# Patient Record
Sex: FEMALE | Race: WHITE | NOT HISPANIC OR LATINO | Employment: STUDENT | URBAN - METROPOLITAN AREA
[De-identification: names, ages, dates, MRNs, and addresses within clinical notes are randomized per-mention and may not be internally consistent; named-entity substitution may affect disease eponyms.]

---
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
## 2023-01-31 ENCOUNTER — TELEPHONE (OUTPATIENT)
Dept: OBGYN CLINIC | Facility: CLINIC | Age: 18
End: 2023-01-31

## 2023-01-31 DIAGNOSIS — Z30.41 SURVEILLANCE FOR BIRTH CONTROL, ORAL CONTRACEPTIVES: ICD-10-CM

## 2023-01-31 RX ORDER — NORGESTIMATE AND ETHINYL ESTRADIOL 0.25-0.035
1 KIT ORAL DAILY
Qty: 84 TABLET | Refills: 0 | Status: SHIPPED | OUTPATIENT
Start: 2023-01-31 | End: 2023-04-25

## 2023-01-31 NOTE — TELEPHONE ENCOUNTER
Rec'd call from patient's mother left message requesting refill of her daughter's prescription  Patient's mother not listed on HIPPA (communication page)  Called patient advised her she overdue for a WA and need to schedule  Once scheduled  Will forward medication request for [facility] to review and appropriate to sent in courtesy refills  Ph[facility]  She is scheduled for 03/28/23  [facility] please address

## 2023-02-23 ENCOUNTER — TELEPHONE (OUTPATIENT)
Dept: PSYCHIATRY | Facility: CLINIC | Age: 18
End: 2023-02-23

## 2023-02-23 NOTE — TELEPHONE ENCOUNTER
Mom called to inquire about [facility] therapy services  Writer advised mom that there is a waitlist and added patient to the list  Patient needs 5 or 6 pm appointments

## 2023-03-22 ENCOUNTER — TELEPHONE (OUTPATIENT)
Dept: PSYCHIATRY | Facility: CLINIC | Age: 18
End: 2023-03-22

## 2023-03-22 NOTE — TELEPHONE ENCOUNTER
Behavioral Health Outpatient Intake Questions    Referred By   : Self     Please advise interviewee that they need to answer all questions truthfully to allow for best care, and any misrepresentations of information may affect their ability to be seen at this clinic   => Was this discussed? Yes     If Minor Child (under age 25)    Who is/are the legal guardian(s) of the child? Is there a custody agreement? No     • If "YES"- Custody orders must be obtained prior to scheduling the first appointment  • In addition, Consent to Treatment must be signed by all legal guardians prior to scheduling the first appointment    • If "NO"- Consent to Treatment must be signed by all legal guardians prior to scheduling the first appointment    Behavioral Health Outpatient Intake History -     Presenting Problem (in patient's own words): Anxiety, stress     Are there any communication barriers for this patient? No                                               If yes, please describe barriers: no  • If there is a unique situation, please refer to [address] [place] [address] for final determination  Are you taking any psychiatric medications? No   •   If "YES" -What are they no   •   If "YES" -Who prescribes? Has the Patient previously received outpatient Talk Therapy or Medication Management from [facility]     •    If "YES"- When, Where and with Whom? •    If "NO" -Has Patient received these services elsewhere? •   If "YES" -When, Where, and with Whom? Individual private therapy, does not remember location    Has the Patient abused alcohol or other substances in the last 6 months ? No  No concerns of substance abuse are reported  •  If "YES" -What substance, How much, How often? •  If illegal substance: Refer to [employer] [facility] (for FRANCA) or [employer]    •  If Alcohol in excess of 10 drinks per week:  Refer to [employer] [facility] (for FRANCA) or [address] [place] [address] History-     Is this treatment court ordered? No   • If "Yes"- refer to [address] [place] [address] for final determination  Has the Patient been convicted of a felony? No  •  If "Yes" -When, What? • Talk Therapy : Send to [address] [place] [address] for final determination   • Med Management: Send to Dr Yash Woods for final determination     ACCEPTED as a patient Yes  • If "Yes" Appointment Date: 03/03/2023 @ 5PM with Emilia Raddle Beam    Referred Elsewhere? No  • If “Yes” - (Where? Ex: Hardik Corey, [facility]UDRE[facility]WANDA, [address] [place] [address], etc )       Name of Insurance Co: [address] [place] [address] ID# [id number]  Insurance Phone #  If ins is primary or secondary? Primary  If patient is a minor, parents information such as Name, D  O B of guarantor

## 2023-03-23 ENCOUNTER — DOCUMENTATION (OUTPATIENT)
Dept: BEHAVIORAL/MENTAL HEALTH CLINIC | Facility: CLINIC | Age: 18
End: 2023-03-23

## 2023-03-27 NOTE — PROGRESS NOTES
[address], [facility], [address] [facility]    ASSESSMENT/PLAN: Alva Lizama is a 21 y o  Roland Goodwin who presents for annual gynecologic exam     Encounter for routine gynecologic examination  - Routine well woman exam completed today  - HPV Vaccination status: Immunization series complete  Neg  covid  Vaccine   Counseled on    - STI screening offered including HIV testing: today  - Contraceptive counseling discussed  Current contraception: combination OCPs:     Additional problems addressed during this visit:  1  Encounter for gynecological examination without abnormal finding    2  Dysmenorrhea    3  Screen for STD (sexually transmitted disease)  -     Chlamydia/GC amplified DNA by PCR; Future  -     Chlamydia/GC amplified DNA by PCR    4  Surveillance for birth control, oral contraceptives    5  Contraceptive education  -     norethindrone-ethinyl estradiol-ferrous fumarate (Junel Fe 24) 1-20 MG-MCG() per tablet; Take 1 tablet by mouth daily    Dw pt  Motrin 600 mg every 6 hours while awake start w onset of period  Start  Junel 1/20 24 d  To lighten period    Reviewed ACHES and BTB    CC:  Annual Gynecologic Examination    HPI: Alva Lizama is a 21 y o  Roland Goodwin who presents for annual gynecologic examination  20 yo  her for wellness exam   -30 d for 4-5  D mild  Cramping  On  Ocp Denies ACHES and BTB    No family hx of breast ovarian or colon cancer   + feels her mood is off on  OCP     + night sweats   w menses   No missed pills   + sexually active using condoms  The following portions of the patient's history were reviewed and updated as appropriate: She  has a past medical history of Asthma  She  has a past surgical history that includes Tonsillectomy (10/21/2021)  Her family history is not on file  She  reports that she has never smoked  She has never been exposed to tobacco smoke   She has never used smokeless tobacco  She reports "that she does not drink alcohol and does not use drugs  Current Outpatient Medications   Medication Sig Dispense Refill   • Atrovent HFA 17 MCG/ACT inhaler TAKE 2 PUFF(S) (INHALATION) 3 TIMES PER DAY FOR 30 DAYS     • famotidine (PEPCID) 40 MG tablet Take 40 mg by mouth daily at bedtime     • Flovent  MCG/ACT inhaler INHALE 2 PUFFS BY MOUTH TWICE A DAY  RINSE MOUTH AFTER USE FOR DAILY USE FOR ASTHMA     • montelukast (SINGULAIR) 10 mg tablet Take 10 mg by mouth daily     • norethindrone-ethinyl estradiol-ferrous fumarate (Junel Fe 24) 1-20 MG-MCG(24) per tablet Take 1 tablet by mouth daily 90 tablet 4   • omeprazole (PriLOSEC) 40 MG capsule Take 1 capsule (40 mg total) by mouth daily 30 capsule 1   • senna (SENOKOT) 8 6 mg Take 2 tablets (17 2 mg total) by mouth daily at bedtime 60 tablet 2     No current facility-administered medications for this visit  She is allergic to amoxicillin       Review of Systems   Constitutional: Negative for chills and fever  HENT: Negative for ear pain and sore throat  Eyes: Negative for pain and visual disturbance  Respiratory: Negative for cough and shortness of breath  Cardiovascular: Negative for chest pain and palpitations  Gastrointestinal: Negative for abdominal pain and vomiting  Genitourinary: Negative for dysuria, hematuria and menstrual problem  Musculoskeletal: Negative for arthralgias and back pain  Skin: Negative for color change and rash  Neurological: Negative for seizures and syncope  Hematological: Negative  Psychiatric/Behavioral: Negative  All other systems reviewed and are negative  Objective:  /70 (BP Location: Left arm, Patient Position: Sitting, Cuff Size: Standard)   Ht 5' 5 5\" (1 664 m)   Wt 53 1 kg (117 lb)   LMP 03/21/2023   BMI 19 17 kg/m²    Physical Exam  Vitals and nursing note reviewed  Constitutional:       Appearance: Normal appearance  HENT:      Head: Normocephalic     Cardiovascular:      " Rate and Rhythm: Normal rate and regular rhythm  Pulses: Normal pulses  Pulmonary:      Effort: Pulmonary effort is normal       Breath sounds: Normal breath sounds  Chest:      Chest wall: No mass, lacerations, swelling, tenderness or edema  Breasts: Felice Score is 4  Breasts are symmetrical       Right: Normal  No swelling, bleeding, inverted nipple, mass, nipple discharge, skin change or tenderness  Left: No swelling, bleeding, inverted nipple, mass, nipple discharge, skin change or tenderness  Abdominal:      General: Abdomen is flat  Bowel sounds are normal       Palpations: Abdomen is soft  Genitourinary:     General: Normal vulva  Exam position: Lithotomy position  Pubic Area: No rash  Felice stage (genital): 4       Labia:         Right: No rash, tenderness or lesion  Left: No rash, tenderness or lesion  Urethra: No urethral pain, urethral swelling or urethral lesion  Vagina: Normal       Cervix: No cervical motion tenderness or discharge  Uterus: Normal        Adnexa: Right adnexa normal and left adnexa normal       Rectum: Normal    Musculoskeletal:         General: Normal range of motion  Cervical back: Neck supple  Lymphadenopathy:      Upper Body:      Right upper body: No supraclavicular, axillary or pectoral adenopathy  Left upper body: No supraclavicular, axillary or pectoral adenopathy  Lower Body: No right inguinal adenopathy  No left inguinal adenopathy  Skin:     General: Skin is warm and dry  Neurological:      General: No focal deficit present  Mental Status: She is alert and oriented to person, place, and time  Psychiatric:         Mood and Affect: Mood normal          Behavior: Behavior normal          Thought Content:  Thought content normal          Judgment: Judgment normal

## 2023-03-27 NOTE — PATIENT INSTRUCTIONS
Pap every 3 years if normal, STI testing as indicated, starting at age  22 exercise most days of week, obtain appropriate diet and hydration, Calcium 1000mg + 600 vit D daily, birth control as directed (ACHES reviewed)  Benefits, risks and alternatives discussed/reviewed  Condom use when sexually active for sexually transmitted infection prevention  HPV 9 vaccine recommended through age 39  Check with your insurance for coverage  If covered, call office to schedule start of vaccine series  Annual mammogram starting at age 36, monthly breast self exam  Trinda Band   at red light or at least  20 times a day

## 2023-03-28 ENCOUNTER — ANNUAL EXAM (OUTPATIENT)
Dept: OBGYN CLINIC | Facility: CLINIC | Age: 18
End: 2023-03-28

## 2023-03-28 VITALS
HEIGHT: 66 IN | WEIGHT: 117 LBS | SYSTOLIC BLOOD PRESSURE: 110 MMHG | BODY MASS INDEX: 18.8 KG/M2 | DIASTOLIC BLOOD PRESSURE: 70 MMHG

## 2023-03-28 DIAGNOSIS — Z30.41 SURVEILLANCE FOR BIRTH CONTROL, ORAL CONTRACEPTIVES: ICD-10-CM

## 2023-03-28 DIAGNOSIS — Z01.419 ENCOUNTER FOR GYNECOLOGICAL EXAMINATION WITHOUT ABNORMAL FINDING: Primary | ICD-10-CM

## 2023-03-28 DIAGNOSIS — Z11.3 SCREEN FOR STD (SEXUALLY TRANSMITTED DISEASE): ICD-10-CM

## 2023-03-28 DIAGNOSIS — Z30.09 CONTRACEPTIVE EDUCATION: ICD-10-CM

## 2023-03-28 DIAGNOSIS — N94.6 DYSMENORRHEA: ICD-10-CM

## 2023-03-28 RX ORDER — FAMOTIDINE 40 MG/1
40 TABLET, FILM COATED ORAL
COMMUNITY
Start: 2023-03-16

## 2023-03-28 RX ORDER — DEXAMETHASONE 4 MG/1
TABLET ORAL
COMMUNITY
Start: 2023-01-02

## 2023-03-28 RX ORDER — NORETHINDRONE ACETATE AND ETHINYL ESTRADIOL AND FERROUS FUMARATE 1MG-20(24)
1 KIT ORAL DAILY
Qty: 90 TABLET | Refills: 4 | Status: SHIPPED | OUTPATIENT
Start: 2023-03-28 | End: 2024-03-27

## 2023-03-28 RX ORDER — MONTELUKAST SODIUM 10 MG/1
10 TABLET ORAL DAILY
COMMUNITY
Start: 2023-03-22

## 2023-03-28 RX ORDER — IPRATROPIUM BROMIDE 17 UG/1
AEROSOL, METERED RESPIRATORY (INHALATION)
COMMUNITY
Start: 2022-12-27

## 2023-03-30 LAB
C TRACH RRNA SPEC QL NAA+PROBE: NEGATIVE
N GONORRHOEA RRNA SPEC QL NAA+PROBE: NEGATIVE

## 2023-04-03 ENCOUNTER — SOCIAL WORK (OUTPATIENT)
Dept: BEHAVIORAL/MENTAL HEALTH CLINIC | Facility: CLINIC | Age: 18
End: 2023-04-03

## 2023-04-03 DIAGNOSIS — F43.22 ADJUSTMENT DISORDER WITH ANXIETY: Primary | ICD-10-CM

## 2023-04-03 DIAGNOSIS — Z63.9 FAMILY DYNAMICS PROBLEM: ICD-10-CM

## 2023-04-03 PROBLEM — F43.21 ADJUSTMENT DISORDER WITH DEPRESSED MOOD: Status: ACTIVE | Noted: 2023-04-03

## 2023-04-03 SDOH — SOCIAL STABILITY - SOCIAL INSECURITY: PROBLEM RELATED TO PRIMARY SUPPORT GROUP, UNSPECIFIED: Z63.9
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
## 2023-05-08 ENCOUNTER — TELEPHONE (OUTPATIENT)
Dept: BEHAVIORAL/MENTAL HEALTH CLINIC | Facility: CLINIC | Age: 18
End: 2023-05-08

## 2023-05-08 NOTE — TELEPHONE ENCOUNTER
Client called to reschedule her appointment for today  The clinician and client rescheduled the appointment for Wednesday 5/24th  Client confirmed her availability for that day

## 2023-06-12 ENCOUNTER — SOCIAL WORK (OUTPATIENT)
Dept: BEHAVIORAL/MENTAL HEALTH CLINIC | Facility: CLINIC | Age: 18
End: 2023-06-12
Payer: COMMERCIAL

## 2023-06-12 DIAGNOSIS — Z63.9 FAMILY DYNAMICS PROBLEM: ICD-10-CM

## 2023-06-12 DIAGNOSIS — F43.22 ADJUSTMENT DISORDER WITH ANXIETY: Primary | ICD-10-CM

## 2023-06-12 PROCEDURE — 90834 PSYTX W PT 45 MINUTES: CPT

## 2023-06-12 SDOH — SOCIAL STABILITY - SOCIAL INSECURITY: PROBLEM RELATED TO PRIMARY SUPPORT GROUP, UNSPECIFIED: Z63.9
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
## 2023-06-26 ENCOUNTER — TELEPHONE (OUTPATIENT)
Dept: OBGYN CLINIC | Facility: CLINIC | Age: 18
End: 2023-06-26

## 2023-06-26 DIAGNOSIS — Z30.41 SURVEILLANCE FOR BIRTH CONTROL, ORAL CONTRACEPTIVES: ICD-10-CM

## 2023-06-26 DIAGNOSIS — N94.6 DYSMENORRHEA: Primary | ICD-10-CM

## 2023-06-26 DIAGNOSIS — Z30.09 CONTRACEPTIVE EDUCATION: ICD-10-CM

## 2023-06-26 NOTE — TELEPHONE ENCOUNTER
Brady Watson, pts mother left a message Junel 24 Fe requires prior authorization  Started prior authorization on covermymeds  com  Fisher: [id number]  Ivy Lara Listed pt tried Junel Fe 1/20; Cryselle 0 3-30 mg-mcg; Sprintec 0 25-35 mg-mcg   All three medications caused pt Depression & mood swings  Awaiting [facility] response

## 2023-06-27 NOTE — TELEPHONE ENCOUNTER
[facility] denied Junel Fe 24  List of covered meds: [facility]acy [facility], [place], Lo-Zumandimine, Miriam Blue,  Drospirenone-Ethinyl Estradiol 3-0 02 mg  [facility]

## 2023-06-28 RX ORDER — NORETHINDRONE ACETATE AND ETHINYL ESTRADIOL, ETHINYL ESTRADIOL AND FERROUS FUMARATE 1MG-10(24)
1 KIT ORAL DAILY
Qty: 90 TABLET | Refills: 2 | Status: SHIPPED | OUTPATIENT
Start: 2023-06-28

## 2023-06-29 NOTE — TELEPHONE ENCOUNTER
Spoke with patient and informed her that her prescription was sent for Lo Loestrin Fe to her pharmacy  Patient verbalized understanding and appreciated phone call

## 2023-07-10 ENCOUNTER — SOCIAL WORK (OUTPATIENT)
Dept: BEHAVIORAL/MENTAL HEALTH CLINIC | Facility: CLINIC | Age: 18
End: 2023-07-10
Payer: COMMERCIAL

## 2023-07-10 DIAGNOSIS — Z63.9 FAMILY DYNAMICS PROBLEM: ICD-10-CM

## 2023-07-10 DIAGNOSIS — F43.22 ADJUSTMENT DISORDER WITH ANXIETY: Primary | ICD-10-CM

## 2023-07-10 PROCEDURE — 90834 PSYTX W PT 45 MINUTES: CPT

## 2023-07-10 SDOH — SOCIAL STABILITY - SOCIAL INSECURITY: PROBLEM RELATED TO PRIMARY SUPPORT GROUP, UNSPECIFIED: Z63.9
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
## 2023-08-14 ENCOUNTER — SOCIAL WORK (OUTPATIENT)
Dept: BEHAVIORAL/MENTAL HEALTH CLINIC | Facility: CLINIC | Age: 18
End: 2023-08-14
Payer: COMMERCIAL

## 2023-08-14 DIAGNOSIS — F43.22 ADJUSTMENT DISORDER WITH ANXIETY: Primary | ICD-10-CM

## 2023-08-14 PROCEDURE — 90834 PSYTX W PT 45 MINUTES: CPT

## 2023-08-16 NOTE — PSYCH
Behavioral Health Psychotherapy Progress Note    Psychotherapy Provided: Individual Psychotherapy     No diagnosis found. Goals addressed in session: Goal 1     DATA: Session focused on the client adjusting to her boyfriend being away at school. She stated that over the summer she spent most days with her boyfriend, and now that he has returned to school she worries. She expressed that she has concerns about his excessive drinking and at times his lack of common sense. Client discussed her frustrations with her boyfriend's family and the challenges she experiences. During this session, this clinician used the following therapeutic modalities: Engagement Strategies, Client-centered Therapy, Cognitive Behavioral Therapy, Motivational Interviewing and Supportive Psychotherapy    Substance Abuse was not addressed during this session. If the client is diagnosed with a co-occurring substance use disorder, please indicate any changes in the frequency or amount of use: None. Stage of change for addressing substance use diagnoses: No substance use/Not applicable    ASSESSMENT:  Donnell Meza presents with a Euthymic/ normal mood. her affect is Normal range and intensity, which is congruent, with her mood and the content of the session. The client has made progress on their goals. Donnell Meza presents with a minimal risk of suicide, minimal risk of self-harm, and minimal risk of harm to others. For any risk assessment that surpasses a "low" rating, a safety plan must be developed. A safety plan was indicated: no  If yes, describe in detail N/A    PLAN: Between sessions, Donnell Meza will reflect on session and practice self care. At the next session, the therapist will use Engagement Strategies, Client-centered Therapy, Cognitive Behavioral Therapy, Motivational Interviewing and Supportive Psychotherapy to address anxiety and emotional regulation.     Behavioral Health Treatment Plan and Discharge Planning: Thereasa Cousin is aware of and agrees to continue to work on their treatment plan. They have identified and are working toward their discharge goals.  yes    This note was not shared with the patient due to this is a psychotherapy note    Visit start and stop times:    08/16/23  Start Time: 1700  Stop Time: 1750  Total Visit Time: 50 minutes

## 2023-08-28 ENCOUNTER — SOCIAL WORK (OUTPATIENT)
Dept: BEHAVIORAL/MENTAL HEALTH CLINIC | Facility: CLINIC | Age: 18
End: 2023-08-28
Payer: COMMERCIAL

## 2023-08-28 DIAGNOSIS — F43.22 ADJUSTMENT DISORDER WITH ANXIETY: Primary | ICD-10-CM

## 2023-08-28 PROCEDURE — 90834 PSYTX W PT 45 MINUTES: CPT

## 2023-09-03 NOTE — PSYCH
Behavioral Health Psychotherapy Progress Note    Psychotherapy Provided: Individual Psychotherapy     1. Adjustment disorder with anxiety            Goals addressed in session: Goal 1     DATA: Session focused on the anxiety the client was experiencing since the last session. The client expressed having feelings of nervousness and pain in her stomach that she believes were anxiety related. She explained that she noticed these feelings when she is traveling outside of her home town. Clinician spoke to the client about implementing some positive self talk and breathing techniques to help with managing uncomfortable feelings. During this session, this clinician used the following therapeutic modalities: Engagement Strategies, Client-centered Therapy, Cognitive Behavioral Therapy, Motivational Interviewing and Supportive Psychotherapy    Substance Abuse was not addressed during this session. If the client is diagnosed with a co-occurring substance use disorder, please indicate any changes in the frequency or amount of use: None. Stage of change for addressing substance use diagnoses: No substance use/Not applicable    ASSESSMENT:  Adolphus Nyhan presents with a Euthymic/ normal mood. her affect is Normal range and intensity, which is congruent, with her mood and the content of the session. The client has made progress on their goals. Adolphus Nyhan presents with a minimal risk of suicide, minimal risk of self-harm, and minimal risk of harm to others. For any risk assessment that surpasses a "low" rating, a safety plan must be developed. A safety plan was indicated: no  If yes, describe in detail: N/A    PLAN: Between sessions, Adolphus Nyhan will reflect on session content and practice self care.  At the next session, the therapist will use Engagement Strategies, Client-centered Therapy and Cognitive Behavioral Therapy to address anxiety symptoms, triggers, and coping skills. Behavioral Health Treatment Plan and Discharge Planning: Amy Lynne is aware of and agrees to continue to work on their treatment plan. They have identified and are working toward their discharge goals.  yes    This note was not shared with the patient due to this is a psychotherapy note    Visit start and stop times:    09/03/23  Start Time: 1705  Stop Time: 1748  Total Visit Time: 43 minutes

## 2023-09-18 ENCOUNTER — SOCIAL WORK (OUTPATIENT)
Dept: BEHAVIORAL/MENTAL HEALTH CLINIC | Facility: CLINIC | Age: 18
End: 2023-09-18
Payer: COMMERCIAL

## 2023-09-18 DIAGNOSIS — F43.22 ADJUSTMENT DISORDER WITH ANXIETY: Primary | ICD-10-CM

## 2023-09-18 PROCEDURE — 90834 PSYTX W PT 45 MINUTES: CPT

## 2023-09-20 NOTE — PSYCH
Behavioral Health Psychotherapy Progress Note    Psychotherapy Provided: Individual Psychotherapy     1. Adjustment disorder with anxiety            Goals addressed in session: Goal 1     DATA: Session focused on the client's uncertainity with decision making. The client contemplated during the session about her future plans in life. She stated that in November she would be graduating from [employer] school. 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 The clinician engaged the client to think through the thoughts and had the client practice challenging her anxious thoughts. During this session, this clinician used the following therapeutic modalities: Engagement Strategies, Client-centered Therapy, Cognitive Behavioral Therapy, Motivational Interviewing and Supportive Psychotherapy    Substance Abuse was not addressed during this session. If the client is diagnosed with a co-occurring substance use disorder, please indicate any changes in the frequency or amount of use: None. Stage of change for addressing substance use diagnoses: No substance use/Not applicable    ASSESSMENT:  Corbin Oneil presents with a Euthymic/ normal and Anxious mood. her affect is Normal range and intensity, which is congruent, with her mood and the content of the session. The client has made progress on their goals. Corbin Oneil presents with a minimal risk of suicide, minimal risk of self-harm, and minimal risk of harm to others. For any risk assessment that surpasses a "low" rating, a safety plan must be developed. A safety plan was indicated: no  If yes, describe in detail N/A    PLAN: Between sessions, Corbin Oneil will reflect on session, reflecting on goals and making a plan.  At the next session, the therapist will use Engagement Strategies, Client-centered Therapy, Cognitive Behavioral Therapy, Motivational Interviewing and Supportive Psychotherapy to address treatment plan goals. Behavioral Health Treatment Plan and Discharge Planning: Corbin Oneil is aware of and agrees to continue to work on their treatment plan. They have identified and are working toward their discharge goals.  yes    This note was not shared with the patient due to this is a psychotherapy note    Visit start and stop times:    09/20/23  Start Time: 1708  Stop Time: 1755  Total Visit Time: 47 minutes

## 2023-10-02 ENCOUNTER — TELEMEDICINE (OUTPATIENT)
Dept: BEHAVIORAL/MENTAL HEALTH CLINIC | Facility: CLINIC | Age: 18
End: 2023-10-02
Payer: COMMERCIAL

## 2023-10-02 DIAGNOSIS — F43.22 ADJUSTMENT DISORDER WITH ANXIETY: Primary | ICD-10-CM

## 2023-10-02 PROCEDURE — 90834 PSYTX W PT 45 MINUTES: CPT
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
## 2023-10-30 ENCOUNTER — SOCIAL WORK (OUTPATIENT)
Dept: BEHAVIORAL/MENTAL HEALTH CLINIC | Facility: CLINIC | Age: 18
End: 2023-10-30
Payer: COMMERCIAL

## 2023-10-30 DIAGNOSIS — F43.22 ADJUSTMENT DISORDER WITH ANXIETY: Primary | ICD-10-CM

## 2023-10-30 PROCEDURE — 90834 PSYTX W PT 45 MINUTES: CPT

## 2023-11-03 NOTE — PSYCH
Behavioral Health Psychotherapy Progress Note    Psychotherapy Provided: Individual Psychotherapy     1. Adjustment disorder with anxiety            Goals addressed in session: Goal 1     DATA: Session began with a mental health check in with the client. The client stated that she was going through some issues with her close friends. The client discussed the difference between her lifestyle and that with her friends. The client discussed the history with her friends and they prioritized partying compared to her focus on work and her relationship. The client stated that she was struggling with maintaining the friendship or distancing herself. During this session, this clinician used the following therapeutic modalities: Engagement Strategies, Client-centered Therapy, Cognitive Behavioral Therapy, Motivational Interviewing, and Supportive Psychotherapy    Substance Abuse was not addressed during this session. If the client is diagnosed with a co-occurring substance use disorder, please indicate any changes in the frequency or amount of use: None. Stage of change for addressing substance use diagnoses: No substance use/Not applicable    ASSESSMENT:  Reese Lauren presents with a Euthymic/ normal mood. her affect is Normal range and intensity, which is congruent, with her mood and the content of the session. The client has made progress on their goals. Reese Lauren presents with a minimal risk of suicide, minimal risk of self-harm, and minimal risk of harm to others. For any risk assessment that surpasses a "low" rating, a safety plan must be developed. A safety plan was indicated: no  If yes, describe in detail N/A    PLAN: Between sessions, Reese Lauren will reflect on the content of session and practice boundary setting.  At the next session, the therapist will use Engagement Strategies, Client-centered Therapy, Cognitive Behavioral Therapy, Motivational Interviewing, and Supportive Psychotherapy to address anxiety, boundary setting, and emotional regulation. Behavioral Health Treatment Plan and Discharge Planning: Minh Soto is aware of and agrees to continue to work on their treatment plan. They have identified and are working toward their discharge goals.  yes    This note was not shared with the patient due to this is a psychotherapy note    Visit start and stop times:    11/03/23  Start Time: 1705  Stop Time: 1755  Total Visit Time: 50 minutes

## 2023-11-13 ENCOUNTER — SOCIAL WORK (OUTPATIENT)
Dept: BEHAVIORAL/MENTAL HEALTH CLINIC | Facility: CLINIC | Age: 18
End: 2023-11-13
Payer: COMMERCIAL

## 2023-11-13 DIAGNOSIS — F43.22 ADJUSTMENT DISORDER WITH ANXIETY: Primary | ICD-10-CM

## 2023-11-13 PROCEDURE — 90837 PSYTX W PT 60 MINUTES: CPT

## 2023-11-20 ENCOUNTER — TELEPHONE (OUTPATIENT)
Dept: OBGYN CLINIC | Facility: CLINIC | Age: 18
End: 2023-11-20

## 2023-11-20 NOTE — TELEPHONE ENCOUNTER
Left v/m reminding patient that she has an appt scheduled on 12/7 and to give us a call back regarding test that was advised to take on prior telephone encounter.

## 2023-11-20 NOTE — PSYCH
Behavioral Health Psychotherapy Progress Note    Psychotherapy Provided: Individual Psychotherapy     1. Adjustment disorder with anxiety            Goals addressed in session: Goal 1     DATA: Session began with a mental health check in with client. The client discussed stressful events that have been present since their last session. The client discussed challenges she had been experiencing with her friendships and there differences. During this session, this clinician used the following therapeutic modalities: Engagement Strategies, Client-centered Therapy, Cognitive Behavioral Therapy, Motivational Interviewing, and Supportive Psychotherapy    Substance Abuse was not addressed during this session. If the client is diagnosed with a co-occurring substance use disorder, please indicate any changes in the frequency or amount of use: None. Stage of change for addressing substance use diagnoses: No substance use/Not applicable    ASSESSMENT:  Lanie Gold presents with a Euthymic/ normal mood. her affect is Normal range and intensity, which is congruent, with her mood and the content of the session. The client has made progress on their goals. Lanie Gold presents with a minimal risk of suicide, minimal risk of self-harm, and minimal risk of harm to others. For any risk assessment that surpasses a "low" rating, a safety plan must be developed. A safety plan was indicated: no  If yes, describe in detail N/A    PLAN:  Lanie Gold will reflect on the content of session. At the next session, the therapist will use Engagement Strategies, Client-centered Therapy, Cognitive Behavioral Therapy, Motivational Interviewing, and Supportive Psychotherapy to address emotional dysregulation, boundaries, and self care. Behavioral Health Treatment Plan and Discharge Planning: Lanie Gold is aware of and agrees to continue to work on their treatment plan.  They have identified and are working toward their discharge goals.  Yes    This note was not shared with the patient due to this is a psychotherapy note    Visit start and stop times:    11/20/23  Start Time: 1700  Stop Time: 1753  Total Visit Time: 53 minutes

## 2023-11-20 NOTE — TELEPHONE ENCOUNTER
Patient left v/m concerning her current birth control. Spoke with patient and she reports getting her period and then having bleeding in between her periods lasting for 3-4 days, for the past 2 months. She denies missing any pills or having any added stress in her life. She confirms taking her pill at the same time every day. Patient admits to being sexually active but not consistently. Patient was advised to take a HPT to r/o pregnancy, as she states "it is highly unlikely that I'm pregnant but I'll take one."  Her current pharmacy she uses is as on file. Sandra, please advise for further recommendations.

## 2023-11-27 ENCOUNTER — SOCIAL WORK (OUTPATIENT)
Dept: BEHAVIORAL/MENTAL HEALTH CLINIC | Facility: CLINIC | Age: 18
End: 2023-11-27
Payer: COMMERCIAL

## 2023-11-27 DIAGNOSIS — F43.22 ADJUSTMENT DISORDER WITH ANXIETY: Primary | ICD-10-CM

## 2023-11-27 PROCEDURE — 90837 PSYTX W PT 60 MINUTES: CPT

## 2023-11-30 NOTE — PSYCH
Behavioral Health Psychotherapy Progress Note    Psychotherapy Provided: Individual Psychotherapy     1. Adjustment disorder with anxiety            Goals addressed in session: Goal 1     DATA: Session began with a mental health check in with client. The client discussed stressful events that have been present since their last session. The client discussed challenges she had been experiencing with her friendships and there differences. During this session, this clinician used the following therapeutic modalities: Engagement Strategies, Client-centered Therapy, Cognitive Behavioral Therapy, Motivational Interviewing, and Supportive Psychotherapy    Substance Abuse was not addressed during this session. If the client is diagnosed with a co-occurring substance use disorder, please indicate any changes in the frequency or amount of use: None. Stage of change for addressing substance use diagnoses: No substance use/Not applicable    ASSESSMENT:  Nathaneil Landau presents with a Euthymic/ normal mood. her affect is Normal range and intensity, which is congruent, with her mood and the content of the session. The client has made progress on their goals. Nathaneil Landau presents with a minimal risk of suicide, minimal risk of self-harm, and minimal risk of harm to others. For any risk assessment that surpasses a "low" rating, a safety plan must be developed. A safety plan was indicated: no  If yes, describe in detail N/A    PLAN: Nathaneil Landau will reflect on the content of session, practice and set boundaries. At the next session, the therapist will use Engagement Strategies, Client-centered Therapy, Cognitive Behavioral Therapy, Motivational Interviewing, and Supportive Psychotherapy to address emotional dysregulation, boundaries, anxiety, and coping strategies.     Behavioral Health Treatment Plan and Discharge Planning: Nathaneil Landau is aware of and agrees to continue to work on their treatment plan. They have identified and are working toward their discharge goals.  yes    This note was not shared with the patient due to this is a psychotherapy note    Visit start and stop times:    12/01/23  Start Time: 1700  Stop Time: 1755  Total Visit Time: 55 minutes

## 2023-12-10 NOTE — PROGRESS NOTES
Assessment/Plan:  Irreg bleeding on OCP will increase dose of OCP give 3 months to adjust   Call with continued issue  F/u [place] 3 months      Diagnoses and all orders for this visit:    Encounter for prescription of oral contraceptives  -     norethindrone-ethinyl estradiol-ferrous fumarate (Junel Fe 24) 1-20 MG-MCG(24) per tablet; Take 1 tablet by mouth daily          Subjective:      Patient ID: Minh Soto is a 21 y.o. female. Here with concerns irreg bleeding on OCP lo william estrin  Is getting a period the third week of pill pack and then on placebos and occasionally the first week of pill pack Denies missed pills Last [address] [facility] 3/2023         The following portions of the patient's history were reviewed and updated as appropriate: allergies, current medications, past family history, past medical history, past social history, past surgical history, and problem list.    Review of Systems   Genitourinary:  Positive for menstrual problem and vaginal bleeding. Negative for vaginal discharge. Neurological:  Negative for headaches. Psychiatric/Behavioral:  Negative for dysphoric mood. The patient is not nervous/anxious. Objective:      /72   Wt 53.1 kg (117 lb)   BMI 19.17 kg/m²          Physical Exam  Constitutional:       Appearance: Normal appearance. HENT:      Head: Normocephalic and atraumatic. Neurological:      General: No focal deficit present. Mental Status: She is alert and oriented to person, place, and time.    Psychiatric:         Mood and Affect: Mood normal.         Behavior: Behavior normal.

## 2023-12-11 ENCOUNTER — SOCIAL WORK (OUTPATIENT)
Dept: BEHAVIORAL/MENTAL HEALTH CLINIC | Facility: CLINIC | Age: 18
End: 2023-12-11
Payer: COMMERCIAL

## 2023-12-11 DIAGNOSIS — F43.22 ADJUSTMENT DISORDER WITH ANXIETY: Primary | ICD-10-CM

## 2023-12-11 PROCEDURE — 90834 PSYTX W PT 45 MINUTES: CPT

## 2023-12-12 ENCOUNTER — OFFICE VISIT (OUTPATIENT)
Dept: OBGYN CLINIC | Facility: CLINIC | Age: 18
End: 2023-12-12
Payer: COMMERCIAL

## 2023-12-12 VITALS — DIASTOLIC BLOOD PRESSURE: 72 MMHG | SYSTOLIC BLOOD PRESSURE: 118 MMHG | WEIGHT: 117 LBS | BODY MASS INDEX: 19.17 KG/M2

## 2023-12-12 DIAGNOSIS — Z30.011 ENCOUNTER FOR PRESCRIPTION OF ORAL CONTRACEPTIVES: Primary | ICD-10-CM

## 2023-12-12 PROCEDURE — 99213 OFFICE O/P EST LOW 20 MIN: CPT | Performed by: NURSE PRACTITIONER

## 2023-12-12 RX ORDER — NORETHINDRONE ACETATE AND ETHINYL ESTRADIOL AND FERROUS FUMARATE 1MG-20(24)
1 KIT ORAL DAILY
Qty: 84 TABLET | Refills: 1 | Status: SHIPPED | OUTPATIENT
Start: 2023-12-12

## 2023-12-13 ENCOUNTER — TELEPHONE (OUTPATIENT)
Dept: OBGYN CLINIC | Facility: CLINIC | Age: 18
End: 2023-12-13

## 2023-12-13 NOTE — TELEPHONE ENCOUNTER
[facility],Perri started prioir authorization process for [address] with [employer]. Fisher: Anahy Napoles. Finished completing [email or web address] com form. Awaiting [employer] response.

## 2023-12-13 NOTE — PATIENT INSTRUCTIONS
Irreg bleeding on OCP will increase dose of OCP give 3 months to adjust   Call with continued issue  F/u [place] 3 months

## 2023-12-15 NOTE — PSYCH
Behavioral Health Psychotherapy Progress Note    Psychotherapy Provided: Individual Psychotherapy     1. Adjustment disorder with anxiety            Goals addressed in session: Goal 1     DATA: Session began with a mental health check in with client. The client discussed stressful events that have been present since their last session. The client discussed challenges she had been experiencing with her friendships and there differences. During this session, this clinician used the following therapeutic modalities: Engagement Strategies, Client-centered Therapy, Cognitive Behavioral Therapy, Motivational Interviewing, and Supportive Psychotherapy    Substance Abuse was not addressed during this session. If the client is diagnosed with a co-occurring substance use disorder, please indicate any changes in the frequency or amount of use: None. Stage of change for addressing substance use diagnoses: No substance use/Not applicable    ASSESSMENT:  Adolphus Nyhan presents with a Euthymic/ normal mood. her affect is Normal range and intensity, which is not congruent, with her mood and the content of the session. The client has made progress on their goals. Adolphus Nyhan presents with a minimal risk of suicide, minimal risk of self-harm, and minimal risk of harm to others. For any risk assessment that surpasses a "low" rating, a safety plan must be developed. A safety plan was indicated: no  If yes, describe in detail N/A    PLAN: Adolphus Nyhan will reflect on the content of session, practice assertive communication, and continue to implement boundaries. At the next session, the therapist will use Engagement Strategies, Client-centered Therapy, Cognitive Behavioral Therapy, Motivational Interviewing, and Supportive Psychotherapy to address anxiety, boundary setting, assertive communication, and coping strategies.     Behavioral Health Treatment Plan and Discharge Planning: Andreina Yeung Morena Erickson is aware of and agrees to continue to work on their treatment plan. They have identified and are working toward their discharge goals.  yes    This note was not shared with the patient due to this is a psychotherapy note    Visit start and stop times:    12/15/23  Start Time: 1708  Stop Time: 1755  Total Visit Time: 47 minutes

## 2024-01-22 ENCOUNTER — SOCIAL WORK (OUTPATIENT)
Dept: BEHAVIORAL/MENTAL HEALTH CLINIC | Facility: CLINIC | Age: 19
End: 2024-01-22
Payer: COMMERCIAL

## 2024-01-22 DIAGNOSIS — F43.22 ADJUSTMENT DISORDER WITH ANXIETY: Primary | ICD-10-CM

## 2024-01-22 PROCEDURE — 90834 PSYTX W PT 45 MINUTES: CPT

## 2024-01-25 NOTE — PSYCH
"Behavioral Health Psychotherapy Progress Note    Psychotherapy Provided: Individual Psychotherapy     1. Adjustment disorder with anxiety            Goals addressed in session: Goal 1     DATA: Session began with a mental health check in with the client. The client discussed her medical issues that continue to persist giving her nausea and vomitting. The client stated that the doctors are still unsure what is causing the GI issues and require testing. The client shared that she had more medical appointments coming up and hopefully she will get some answers. The client stated that her doctors were suggesting that she stop smoking or reduce her marijuana use to see if it may be the contributing to the nausea. The client stated that originally she was recommended marijuana to help with her GI issues and pain. The client stated that she smoked daily and was concerned about her ability to quit. The clinician recommended researching support groups for assistance.     During this session, this clinician used the following therapeutic modalities: Engagement Strategies, Client-centered Therapy, Cognitive Behavioral Therapy, Motivational Interviewing, and Supportive Psychotherapy    Substance Abuse was addressed during this session. If the client is diagnosed with a co-occurring substance use disorder, please indicate any changes in the frequency or amount of use: daily. Stage of change for addressing substance use diagnoses: Contemplation    ASSESSMENT:  Carin Fraire presents with a Euthymic/ normal mood.     her affect is Normal range and intensity, which is congruent, with her mood and the content of the session. The client has made progress on their goals.     Carin Fraire presents with a minimal risk of suicide, minimal risk of self-harm, and minimal risk of harm to others.    For any risk assessment that surpasses a \"low\" rating, a safety plan must be developed.    A safety plan was indicated: no  If " yes, describe in detail N/A    PLAN:  Carin Fraire will reflect on the content of session, self reflect, and practice journaling. At the next session, the therapist will use Engagement Strategies, Client-centered Therapy, Cognitive Behavioral Therapy, Motivational Interviewing, and Supportive Psychotherapy to address anxiety, emotional dysregulation, relationship stress, coping strategies, and self care.    Behavioral Health Treatment Plan and Discharge Planning: Carin Fraire is aware of and agrees to continue to work on their treatment plan. They have identified and are working toward their discharge goals. yes    This note was not shared with the patient due to this is a psychotherapy note    Visit start and stop times:    01/22/2024  Start Time: 1700  Stop Time: 1748  Total Visit Time: 48 minutes

## 2024-02-21 PROBLEM — Z01.419 ENCOUNTER FOR GYNECOLOGICAL EXAMINATION WITHOUT ABNORMAL FINDING: Status: RESOLVED | Noted: 2021-11-15 | Resolved: 2024-02-21

## 2024-03-18 ENCOUNTER — SOCIAL WORK (OUTPATIENT)
Dept: BEHAVIORAL/MENTAL HEALTH CLINIC | Facility: CLINIC | Age: 19
End: 2024-03-18
Payer: COMMERCIAL

## 2024-03-18 DIAGNOSIS — F43.22 ADJUSTMENT DISORDER WITH ANXIETY: Primary | ICD-10-CM

## 2024-03-18 PROCEDURE — 90834 PSYTX W PT 45 MINUTES: CPT

## 2024-03-26 NOTE — PSYCH
"Behavioral Health Psychotherapy Progress Note    Psychotherapy Provided: Individual Psychotherapy     1. Adjustment disorder with anxiety            Goals addressed in session: Goal 1     DATA: Session began with a mental health check in with the client. 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 The client stated that she had not expected to start working before her family trip, but this job was too good to pass up.The client stated that presently she was focused on working and saving money for her future with her boyfriend. The client stated that there had been a recent setback with her boyfriend having to attend school for another semester. She expressed feeling frustrated but was doing her best to accept the changes that came.    During this session, this clinician used the following therapeutic modalities: Engagement Strategies, Client-centered Therapy, Cognitive Behavioral Therapy, Motivational Interviewing, and Supportive Psychotherapy    Substance Abuse was not addressed during this session. If the client is diagnosed with a co-occurring substance use disorder, please indicate any changes in the frequency or amount of use: None. Stage of change for addressing substance use diagnoses: No substance use/Not applicable    ASSESSMENT:  Carin Fraire presents with a Euthymic/ normal mood.     her affect is Normal range and intensity, which is congruent, with her mood and the content of the session. The client has made progress on their goals.     Carin Fraire presents with a minimal risk of suicide, minimal risk of self-harm, and minimal risk of harm to others.    For any risk assessment that surpasses a \"low\" rating, a safety plan must be developed.    A safety plan was indicated: no  If yes, describe in detail N/A    PLAN: Between sessions, Carin Fraire will reflect on the content of session, practice self care, and " return for therapy in a month. At the next session, the therapist will use Engagement Strategies, Client-centered Therapy, Cognitive Behavioral Therapy, Motivational Interviewing, and Supportive Psychotherapy to address anxiety, stress, emotional regulation, boundary setting, and self care.    Behavioral Health Treatment Plan and Discharge Planning: Carin Fraire is aware of and agrees to continue to work on their treatment plan. They have identified and are working toward their discharge goals. yes    This note was not shared with the patient due to this is a psychotherapy note    Visit start and stop times:    03/18/2024  Start Time: 1700  Stop Time: 1745  Total Visit Time: 45 minutes

## 2024-04-15 ENCOUNTER — SOCIAL WORK (OUTPATIENT)
Dept: BEHAVIORAL/MENTAL HEALTH CLINIC | Facility: CLINIC | Age: 19
End: 2024-04-15
Payer: COMMERCIAL

## 2024-04-15 DIAGNOSIS — F43.22 ADJUSTMENT DISORDER WITH ANXIETY: Primary | ICD-10-CM

## 2024-04-15 PROCEDURE — 90834 PSYTX W PT 45 MINUTES: CPT

## 2024-04-21 NOTE — PSYCH
"Behavioral Health Psychotherapy Progress Note    Psychotherapy Provided: Individual Psychotherapy     1. Adjustment disorder with anxiety            Goals addressed in session: Goal 1     DATA: Session began with a mental health check in. The client discussed day to day stressors in sessions.The client discussed that at present there were no major issues. The client reflected that with the summer coming things would be much slower and she would like to take a break and return in the fall. The clinician confirmed that all appointments would be removed from the schedule and outreach would be made at the end of August.    During this session, this clinician used the following therapeutic modalities: Engagement Strategies, Client-centered Therapy, Cognitive Behavioral Therapy, Motivational Interviewing, and Supportive Psychotherapy    Substance Abuse was not addressed during this session. If the client is diagnosed with a co-occurring substance use disorder, please indicate any changes in the frequency or amount of use: None. Stage of change for addressing substance use diagnoses: No substance use/Not applicable    ASSESSMENT:  Carin Fraire presents with a Euthymic/ normal mood.     her affect is Normal range and intensity, which is congruent, with her mood and the content of the session. The client has made progress on their goals.     Carin Fraire presents with a minimal risk of suicide, minimal risk of self-harm, and minimal risk of harm to others.    For any risk assessment that surpasses a \"low\" rating, a safety plan must be developed.    A safety plan was indicated: no  If yes, describe in detail N/A    PLAN: Between sessions, Carin Fraire will reflect on the content of session and will return to therapy in the fall. At the next session, the therapist will use Engagement Strategies, Client-centered Therapy, Cognitive Behavioral Therapy, Motivational Interviewing, and Supportive " Psychotherapy to address anxiety, depression, emotional regulation, boundary setting, coping strategy, and self care.    Behavioral Health Treatment Plan and Discharge Planning: Carin Fraire is aware of and agrees to continue to work on their treatment plan. They have identified and are working toward their discharge goals. yes    This note was not shared with the patient due to this is a psychotherapy note    Visit start and stop times:    04/22/24  Start Time: 1700  Stop Time: 1745  Total Visit Time: 45 minutes

## 2024-05-06 ENCOUNTER — TELEPHONE (OUTPATIENT)
Dept: PSYCHIATRY | Facility: CLINIC | Age: 19
End: 2024-05-06

## 2024-05-06 NOTE — TELEPHONE ENCOUNTER
Writer called client to inform her of therapy provider's leave of absence. Client informed writer that she was not planning on seeing her until after Summer. Writer will still update client and did let client know if something came up there is an interim wait list.

## 2024-05-15 ENCOUNTER — TELEPHONE (OUTPATIENT)
Age: 19
End: 2024-05-15

## 2024-05-15 NOTE — TELEPHONE ENCOUNTER
Patients mom called to request an appt for hyperhidrosis under arms.  Currently being treated by pcp but not helping.  I offered next available in 2025 and it was declined

## 2024-06-03 DIAGNOSIS — Z30.011 ENCOUNTER FOR PRESCRIPTION OF ORAL CONTRACEPTIVES: ICD-10-CM

## 2024-06-03 RX ORDER — NORETHINDRONE ACETATE AND ETHINYL ESTRADIOL 1MG-20(24)
1 KIT ORAL DAILY
Qty: 28 TABLET | Refills: 5 | Status: SHIPPED | OUTPATIENT
Start: 2024-06-03

## 2024-07-03 ENCOUNTER — TELEPHONE (OUTPATIENT)
Dept: PSYCHIATRY | Facility: CLINIC | Age: 19
End: 2024-07-03

## 2024-07-03 NOTE — TELEPHONE ENCOUNTER
Writer called client to inform her that Lisa Corea is resigning from her position at [employer]. Client is planning on returning; however, wants some time to think things over before being put on a permanent transfer of care list. Writer told client that was ok and would notify intake that she is not a discharge at this time.     Client was on pause until fall due to schedule prior to news of resignation.

## 2024-08-08 ENCOUNTER — TELEPHONE (OUTPATIENT)
Dept: PSYCHIATRY | Facility: CLINIC | Age: 19
End: 2024-08-08

## 2024-08-08 NOTE — TELEPHONE ENCOUNTER
LVM regarding scheduling PENG apt.  Prior clt of K. Beam.   Progress Notes by Rosa Heredia APN at 17 02:44 PM     Author:  Rosa Heredia APN Service:  (none) Author Type:  Nurse Practitioner     Filed:  17 04:44 PM Encounter Date:  2017 Status:  Signed     :  Rosa Heredia APN (Nurse Practitioner)            Collaborating MD-[MN1.1T] Helena Reddy M.D.[MN1.1M]    SUBJECTIVE:   Shanon Vergara is a 29 year old female G[MN1.1T]2[MN1.1M] P[MN1.1T]2[MN1.1M] who presents for her annual well woman exam.[MN1.1T] Has a mirena IUD, but would like to have IUD removed to try and conceive again.[MN1.1M]   No LMP recorded. Patient is not currently having periods (Reason: Mirena IUD).  Contraception method:[MN1.1T] IUD[MN1.1M]    OB History:   Obstetric History       T2      L2     SAB0   TAB0   Ectopic0   Multiple0   Live Births2    Obstetric Comments   delivered 11/15/13 primary c/s failure to progress 7#8 Sisi   delivered 6/11/15 girl repeat c/s 8#2 Rhonda      GYN History:[MN1.1T] Current Contraception:  Mirena IUD[MN1.1M]      Past Medical History:     Diagnosis  Date   • ALLERGIC RHINITIS NOS 2000   • Carpal tunnel syndrome    • LIZZETTE 3 - cervical intraepithelial neoplasia grade 3     LEEP    • DEPRESSIVE DISORDER  NEC 2001    PP anxiety. No treatment    • Herpes genitalis in women     last outbreak 10/2014    • Lumbar disc narrowing - L5-S1 2010   • Meralgia paraesthetica    • OSTEOARTHROS NOS-ANKLE 2009   • Syncope and collapse     neuro-cardiogenic syncope    • Unspecified asthma, with exacerbation     illness induced. Has albiterol inhaler        Past Surgical History:      Procedure  Laterality Date   • CERVIX CONIZATION LOOP ELECTRD      •  SECTION     • fascial release      Done on both legs from compartment syndrome      • INTRAUTERINE DEVICE INSERTION      mirena     • REMOVE TONSILS/ADENOIDS,12+ Y/O     • right ankle surgery         Current Outpatient Prescriptions      Medication  Sig   • valacyclovir (VALTREX) 500 MG tablet Take 1 Tab by mouth daily.   • albuterol (PROAIR HFA) 108 (90 BASE) MCG/ACT inhaler Inhale 1-2 Puffs by mouth every 4 (four) hours as needed for Wheezing.        Allergies: Codeine    Family History:   Family History       Problem   Relation Age of Onset   • Cancer  Mother      Breast, age 44.       • Mental Health  Mother    • Cancer  Maternal Grandmother      Colon     • Mental Health  Maternal Grandmother    • Cancer  None      Ovarian or uterine.      • High Blood Pressure  Father    • Diabetes  Father    • High Cholesterol  Father    • Cancer  Father      prostate     • Stroke  Paternal Grandmother         Social History:   Social History       Substance Use Topics       • Smoking status:   Current Every Day Smoker     Packs/day:  0.25     Years:  8.00     Types:  Cigarettes   • Smokeless tobacco:   Former User   • Alcohol use   No      Comment: occ        Depression Screening:  Over the past 2 weeks, has patient felt down, depressed or hopeless?[MN1.1T] No[MN1.1M]  Over the past 2 weeks, has patient felt little interest or pleasure in doing things?[MN1.1T] No[MN1.1M]    On the basis of the above screen, the following is initiated:[MN1.1T]  Normal screen, continue to monitor for symptoms over time[MN1.1M]    PREVENTATIVE MEDICINE:  Does patient exercise?[MN1.1T] No.[MN1.1M]    Was counseling given:[MN1.1T] Yes[MN1.1M]     GYNE ROS:   Vaginal symptoms:[MN1.1T] none[MN1.1M].  Vulvar symptoms:[MN1.1T] none[MN1.1M].  Other associated symptoms:[MN1.1T] none[MN1.1M]    ROS:   No headaches  No unexplained chest pain, dyspnea, SOB  No abdominal pain  No bowel or bladder complaints  All other systems reviewed are negative    OBJECTIVE:  /80  Ht 5' 1\" (1.549 m)  Wt 225 lb (102.1 kg)  BMI 42.51 kg/m2  Shanon's BMI is 42.51     Alert and oriented x 3.   General exam: Patient appears well.  Neck supple, no adenopathy or masses noted in neck or  supraclavicular regions.  Thyroid is not enlarged.   Chest is clear.   Heart sounds are normal without murmur. RRR.   Abdomen is normal, soft without masses, organomegaly or tenderness.   Skin: no rashes or suspicious skin lesions noted.    Breast Exam:[MN1.1T] -- breasts symmetric  -- no dominant or suspicious mass  -- no skin or nipple changes  -- no nipple discharge  -- no axillary adenopathy.    P[MN1.2M]elvic Exam:  --Genitalia -   ----External Genitalia: Normal appearance and hair distribution.  No lesions noted  ----Urethral Meatus: Normal size and location, no lesions or prolapse.  --Pelvic Exam with speculum  ----Urethra: No masses, tenderness or scarring  ----Bladder: No fullness, masses or tenderness  ----Vagina: Normal general appearance, no discharge or lesions.  Adequate pelvic support.  No cystocele or rectocele found  ----Cervix: Normal appearance without lesions or discharge[MN1.1T]-- IUD strings visible.[MN1.2M]  ----Uterus: Normal size, contour, position, mobility, and support.  No tenderness  ----Adnexa:  No masses, tenderness, organomegaly or nodularity noted    Pap smear[MN1.1T] was collected with broom, prepared, and sent to lab for processing.[MN1.2M]        ASSESSMENT/PLAN:[MN1.1T]  1. Encounter for gynecological examination without abnormal finding[MN1.3T]  Pap guidelines discussed, pap done.  Pt with family hx of breast ca, will check with insurance to see if they will cover mammo. If they will she will call for order to have baseline mammo done.[MN1.2M]     2. Encounter for removal of intrauterine contraceptive device[MN1.3T]  IUD removed with no difficulties.  Planning to try and conceive, advised to stop smoking, and to start taking prenatal vitamins.     GYN exam completed  Self breast exam discussed.    Recommended calcium fortified diet of at least 3 servings a day  Pap results in 7-10 days, patient will be notified, patient instructed to call in 2 wks if no  notification.  Preconceptual counseling  Return for annual GYN exam in one year or earlier with any additional concerns.     Procedure Note:[MN1.2M]  INDICATIONS:  Shanon Vergara is a 29 year old female who presents for IUD removal.  Her current IUD was placed[MN1.2T] 2 years[MN1.2M] ago.  She has[MN1.2T] not had any problems[MN1.2M].  She requests the removal of the IUD because[MN1.2T] she desires to conceive[MN1.2M].  The IUD removal procedure was discussed with the patient and[MN1.2T] her questions were answered, informed consent was obtained and signed and she had no further questions[MN1.2M]  .[MN1.2T]      /80  Ht 5' 1\" (1.549 m)  Wt 225 lb (102.1 kg)  BMI 42.51 kg/m2[MN1.4T]     ASSESSMENT/PLAN:[MN1.2T]  IUD Removal[MN1.2M]    PROCEDURE NOTE:  The patient was placed in a dorsal lithotomy position. A speculum exam was performed and the cervix was visualized. The IUD string[MN1.2T] was[MN1.2M] visualized. The IUD was removed successfully.     The patient tolerated the procedure[MN1.2T] well[MN1.2M] with[MN1.2T] minimal bleeding and pain[MN1.2M].    INSTRUCTIONS:[MN1.2T]  Call if bleeding, pain or fever occur  Pregnancy counseling given[MN1.2M]    Shanon repeated all of the instructions and states she understands the plan of care.[MN1.2T]    Electronically Signed by:    LESLEY Appiah , 9/8/2017     Supervising physician:[MN1.1T] Liyah Pena M.D.[MN1.2M]            Revision History        User Key Date/Time User Provider Type Action    > MN1.4 09/08/17 04:44 PM Rosa Heredia APN Nurse Practitioner Sign     MN1.3 09/08/17 04:40 PM Rosa Heredia APN Nurse Practitioner      MN1.2 09/08/17 04:37 PM Rosa Heredia APN Nurse Practitioner      MN1.1 09/08/17 02:44 PM Rosa Heredia APN Nurse Practitioner     M - Manual, T - Template

## 2024-08-09 ENCOUNTER — APPOINTMENT (RX ONLY)
Dept: URBAN - METROPOLITAN AREA CLINIC 28 | Facility: CLINIC | Age: 19
Setting detail: DERMATOLOGY
End: 2024-08-09

## 2024-08-09 DIAGNOSIS — L74.51 PRIMARY FOCAL HYPERHIDROSIS: ICD-10-CM | Status: INADEQUATELY CONTROLLED

## 2024-08-09 PROBLEM — L74.519 PRIMARY FOCAL HYPERHIDROSIS, UNSPECIFIED: Status: ACTIVE | Noted: 2024-08-09

## 2024-08-09 PROBLEM — L74.510 PRIMARY FOCAL HYPERHIDROSIS, AXILLA: Status: ACTIVE | Noted: 2024-08-09

## 2024-08-09 PROCEDURE — ? COUNSELING

## 2024-08-09 PROCEDURE — 99204 OFFICE O/P NEW MOD 45 MIN: CPT

## 2024-08-09 PROCEDURE — ? PRESCRIPTION MEDICATION MANAGEMENT

## 2024-08-09 PROCEDURE — ? PRESCRIPTION

## 2024-08-09 RX ORDER — GLYCOPYRROLATE 1 MG/1
TABLET ORAL BID
Qty: 60 | Refills: 4 | Status: ERX | COMMUNITY
Start: 2024-08-09

## 2024-08-09 RX ORDER — ALUMINUM CHLORIDE 20 %
SOLUTION, NON-ORAL TOPICAL QDAY
Qty: 37.5 | Refills: 3 | Status: ERX | COMMUNITY
Start: 2024-08-09

## 2024-08-09 RX ADMIN — Medication: at 00:00

## 2024-08-09 RX ADMIN — GLYCOPYRROLATE: 1 TABLET ORAL at 00:00

## 2024-08-09 ASSESSMENT — LOCATION SIMPLE DESCRIPTION DERM
LOCATION SIMPLE: RIGHT AXILLARY VAULT
LOCATION SIMPLE: LEFT BREAST

## 2024-08-09 ASSESSMENT — LOCATION DETAILED DESCRIPTION DERM
LOCATION DETAILED: RIGHT AXILLARY VAULT
LOCATION DETAILED: LEFT AXILLARY TAIL OF BREAST

## 2024-08-09 ASSESSMENT — LOCATION ZONE DERM
LOCATION ZONE: TRUNK
LOCATION ZONE: AXILLAE

## 2024-08-09 NOTE — PROCEDURE: PRESCRIPTION MEDICATION MANAGEMENT
Detail Level: Zone
Initiate Treatment: glycopyrrolate 1 mg tablet BID: Take one tab PO BID\\nDrysol 20 % topical solution QDAY: Apply thin layer to AA of body QHS
Render In Strict Bullet Format?: No

## 2024-08-15 ENCOUNTER — TELEPHONE (OUTPATIENT)
Dept: PSYCHIATRY | Facility: CLINIC | Age: 19
End: 2024-08-15

## 2024-09-11 ENCOUNTER — DOCUMENTATION (OUTPATIENT)
Dept: BEHAVIORAL/MENTAL HEALTH CLINIC | Facility: CLINIC | Age: 19
End: 2024-09-11

## 2024-09-11 DIAGNOSIS — F43.22 ADJUSTMENT DISORDER WITH ANXIETY: Primary | ICD-10-CM

## 2024-09-11 NOTE — PROGRESS NOTES
Psychotherapy Discharge Summary    Preferred Name: Carin Fraire  YOB: 2005    Admission date to psychotherapy: 4/3/23    Referred by: mother    Presenting Problem: Adjustment disorder    Course of treatment included : individual therapy     Progress/Outcome of Treatment Goals (brief summary of course of treatment) Client was able to establish rapport and work on treatment goals.    Treatment Complications (if any): na    Treatment Progress: good    Current [facility] Provider: Lisa Corea    Discharge Medications include: na    Discharge Date: 9/11/24    Discharge Diagnosis:   1. Adjustment disorder with anxiety            Criteria for Discharge:  Client no longer desires to engage in treatment    Aftercare recommendations include (include specific referral names and phone numbers, if appropriate): Return to treatment should ongoing services become necessary in the future.     Prognosis: good

## 2024-11-08 ENCOUNTER — TELEPHONE (OUTPATIENT)
Age: 19
End: 2024-11-08

## 2024-11-14 DIAGNOSIS — Z30.011 ENCOUNTER FOR PRESCRIPTION OF ORAL CONTRACEPTIVES: ICD-10-CM

## 2024-11-14 RX ORDER — NORETHINDRONE ACETATE AND ETHINYL ESTRADIOL 1MG-20(24)
1 KIT ORAL DAILY
Qty: 28 TABLET | Refills: 0 | Status: SHIPPED | OUTPATIENT
Start: 2024-11-14

## 2024-11-19 DIAGNOSIS — Z00.6 ENCOUNTER FOR EXAMINATION FOR NORMAL COMPARISON OR CONTROL IN CLINICAL RESEARCH PROGRAM: ICD-10-CM

## 2024-12-09 ENCOUNTER — TELEPHONE (OUTPATIENT)
Age: 19
End: 2024-12-09

## 2024-12-12 DIAGNOSIS — Z30.011 ENCOUNTER FOR PRESCRIPTION OF ORAL CONTRACEPTIVES: ICD-10-CM

## 2024-12-12 RX ORDER — NORETHINDRONE ACETATE AND ETHINYL ESTRADIOL 1MG-20(24)
1 KIT ORAL DAILY
Qty: 28 TABLET | Refills: 0 | Status: SHIPPED | OUTPATIENT
Start: 2024-12-12 | End: 2024-12-13 | Stop reason: SDUPTHER

## 2024-12-13 ENCOUNTER — ANNUAL EXAM (OUTPATIENT)
Dept: OBGYN CLINIC | Facility: CLINIC | Age: 19
End: 2024-12-13
Payer: COMMERCIAL

## 2024-12-13 VITALS
WEIGHT: 121.4 LBS | SYSTOLIC BLOOD PRESSURE: 120 MMHG | DIASTOLIC BLOOD PRESSURE: 72 MMHG | BODY MASS INDEX: 19.51 KG/M2 | HEIGHT: 66 IN

## 2024-12-13 DIAGNOSIS — Z30.09 CONTRACEPTIVE EDUCATION: ICD-10-CM

## 2024-12-13 DIAGNOSIS — N94.6 DYSMENORRHEA: ICD-10-CM

## 2024-12-13 DIAGNOSIS — Z01.419 ENCOUNTER FOR GYNECOLOGICAL EXAMINATION WITHOUT ABNORMAL FINDING: Primary | ICD-10-CM

## 2024-12-13 DIAGNOSIS — Z30.011 ENCOUNTER FOR PRESCRIPTION OF ORAL CONTRACEPTIVES: ICD-10-CM

## 2024-12-13 DIAGNOSIS — Z11.3 SCREEN FOR STD (SEXUALLY TRANSMITTED DISEASE): ICD-10-CM

## 2024-12-13 PROCEDURE — 99395 PREV VISIT EST AGE 18-39: CPT | Performed by: OBSTETRICS & GYNECOLOGY

## 2024-12-13 RX ORDER — NORETHINDRONE ACETATE AND ETHINYL ESTRADIOL 1MG-20(24)
1 KIT ORAL DAILY
Qty: 90 TABLET | Refills: 3 | Status: SHIPPED | OUTPATIENT
Start: 2024-12-13

## 2024-12-13 NOTE — PATIENT INSTRUCTIONS
Pap every 3 years if normal, starting at age 21  STI testing as indicated, exercise most days of week, obtain appropriate diet and hydration, Calcium 1000mg + 600 vit D daily, birth control as directed (ACHES reviewed). Benefits, risks and alternatives discussed/reviewed. Condom use when sexually active for sexually transmitted infection prevention. HPV 9 vaccine recommended through age 45. Check with your insurance for coverage. If covered, call office to schedule start of vaccine series.  Annual mammogram starting at age 40, monthly breast self exam. Kegels   at red light or at least  20 times a day.

## 2024-12-13 NOTE — PROGRESS NOTES
[facility] - [address], [address], [place] [address]    ASSESSMENT/PLAN: Carin Fraire is a 18 y.o.  who presents for annual gynecologic exam.    Encounter for routine gynecologic examination  - Routine well woman exam completed today.  - HPV Vaccination status: Immunization series complete  - STI screening offered including HIV testing: Declined  - Contraceptive counseling discussed.  Current contraception: condoms or combination OCPs:     Additional problems addressed during this visit:  1. Encounter for gynecological examination without abnormal finding  2. Screen for STD (sexually transmitted disease)  -     Chlamydia/GC JONATAN, Confirmation  3. Dysmenorrhea  4. Contraceptive education  Comments:  condoms  5. Encounter for prescription of oral contraceptives  Comments:  Denies aches and btb  Orders:  -     norethindrone-ethinyl estradiol-ferrous fumarate (Blisovi 24 Fe) 1-20 MG-MCG(24) per tablet; Take 1 tablet by mouth daily    CC:  Annual Gynecologic Examination    HPI: Carin Fraire is a 18 y.o.  who presents for annual gynecologic examination.  89 yo  here for wellness exam.   On ocp  no missed  pills denies   ACHEs and BTB.  + gardasil .  Pap  in one year.  No current patner .      The following portions of the patient's history were reviewed and updated as appropriate: She  has a past medical history of Asthma.  She  has a past surgical history that includes Tonsillectomy (10/21/2021).  Her family history is not on file.  She  reports that she has never smoked. She has never been exposed to tobacco smoke. She has never used smokeless tobacco. She reports that she does not currently use alcohol. She reports that she does not currently use drugs.  Current Outpatient Medications   Medication Sig Dispense Refill   • Atrovent HFA 17 MCG/ACT inhaler TAKE 2 PUFF(S) (INHALATION) 3 TIMES PER DAY FOR 30 DAYS     • famotidine (PEPCID) 40 MG tablet Take 40 mg  "by mouth daily at bedtime     • Flovent  MCG/ACT inhaler INHALE 2 PUFFS BY MOUTH TWICE A DAY. RINSE MOUTH AFTER USE FOR DAILY USE FOR ASTHMA     • montelukast (SINGULAIR) 10 mg tablet Take 10 mg by mouth daily     • norethindrone-ethinyl estradiol-ferrous fumarate (Blisovi 24 Fe) 1-20 MG-MCG(24) per tablet Take 1 tablet by mouth daily 90 tablet 3   • omeprazole (PriLOSEC) 40 MG capsule Take 1 capsule (40 mg total) by mouth daily 30 capsule 1   • senna (SENOKOT) 8.6 mg Take 2 tablets (17.2 mg total) by mouth daily at bedtime 60 tablet 2     No current facility-administered medications for this visit.     She is allergic to amoxicillin..    Review of Systems   Constitutional:  Negative for chills and fever.   HENT:  Negative for ear pain and sore throat.    Eyes:  Negative for pain and visual disturbance.   Respiratory:  Negative for cough and shortness of breath.    Cardiovascular:  Negative for chest pain and palpitations.   Gastrointestinal:  Negative for abdominal pain and vomiting.   Endocrine: Negative.    Genitourinary:  Negative for dysuria and hematuria.   Musculoskeletal:  Negative for arthralgias and back pain.   Skin:  Negative for color change and rash.   Allergic/Immunologic: Negative.    Neurological: Negative.  Negative for seizures and syncope.   Hematological: Negative.    Psychiatric/Behavioral: Negative.     All other systems reviewed and are negative.        Objective:  /72 (BP Location: Left arm, Patient Position: Sitting, Cuff Size: Standard)   Ht 5' 5.5\" (1.664 m)   Wt 55.1 kg (121 lb 6.4 oz)   LMP 12/08/2024   BMI 19.89 kg/m²    Physical Exam  Vitals and nursing note reviewed.   Constitutional:       Appearance: Normal appearance.   HENT:      Head: Normocephalic.   Cardiovascular:      Rate and Rhythm: Normal rate and regular rhythm.      Pulses: Normal pulses.      Heart sounds: Normal heart sounds.   Pulmonary:      Effort: Pulmonary effort is normal.      Breath sounds: " Normal breath sounds.   Chest:      Chest wall: No mass, lacerations, swelling, tenderness or edema.   Breasts:     Eflice Score is 4.      Breasts are symmetrical.      Right: Normal. No swelling, bleeding, inverted nipple, mass, nipple discharge, skin change or tenderness.      Left: No swelling, bleeding, inverted nipple, mass, nipple discharge, skin change or tenderness.   Abdominal:      General: Abdomen is flat. Bowel sounds are normal.      Palpations: Abdomen is soft.   Genitourinary:     General: Normal vulva.      Exam position: Lithotomy position.      Pubic Area: No rash.       Felice stage (genital): 4.      Labia:         Right: No rash, tenderness or lesion.         Left: No rash, tenderness or lesion.       Urethra: No urethral pain, urethral swelling or urethral lesion.      Vagina: Bleeding present.      Cervix: No cervical motion tenderness or discharge.      Uterus: Normal.       Adnexa: Right adnexa normal and left adnexa normal.      Rectum: Normal.   Musculoskeletal:         General: Normal range of motion.      Cervical back: Normal range of motion and neck supple.   Lymphadenopathy:      Upper Body:      Right upper body: No supraclavicular, axillary or pectoral adenopathy.      Left upper body: No supraclavicular, axillary or pectoral adenopathy.      Lower Body: No right inguinal adenopathy. No left inguinal adenopathy.   Skin:     General: Skin is warm and dry.   Neurological:      General: No focal deficit present.      Mental Status: She is alert and oriented to person, place, and time.   Psychiatric:         Mood and Affect: Mood normal.         Behavior: Behavior normal.         Thought Content: Thought content normal.         Judgment: Judgment normal.

## 2024-12-19 LAB
C TRACH RRNA SPEC QL NAA+PROBE: NEGATIVE
N GONORRHOEA RRNA SPEC QL NAA+PROBE: NEGATIVE

## 2024-12-23 NOTE — TELEPHONE ENCOUNTER
"Behavioral Health Outpatient Intake Questions    Referred By   : PCP     Please advise interviewee that they need to answer all questions truthfully to allow for best care, and any misrepresentations of information may affect their ability to be seen at this clinic   => Was this discussed? Yes     If Minor Child (under age 18)    Who is/are the legal guardian(s) of the child?     Is there a custody agreement? No     If \"YES\"- Custody orders must be obtained prior to scheduling the first appointment  In addition, Consent to Treatment must be signed by all legal guardians prior to scheduling the first appointment    If \"NO\"- Consent to Treatment must be signed by all legal guardians prior to scheduling the first appointment    Behavioral Health Outpatient Intake History -     Presenting Problem (in patient's own words):     Pt stated that she has bad anxiety that causes her to throw up all the time     Are there any communication barriers for this patient?     No                                               If yes, please describe barriers:  NONE   If there is a unique situation, please refer to Clyde Guevara/Xiomara Cain for final determination.    Are you taking any psychiatric medications? No     If \"YES\" -What are they  NONE       If \"YES\" -Who prescribes?     Has the Patient previously received outpatient Talk Therapy or Medication Management from [facility]  Yes        If \"YES\"- When, Where and with Whom? Lisa Corea         If \"NO\" -Has Patient received these services elsewhere?       If \"YES\" -When, Where, and with Whom?    Has the Patient abused alcohol or other substances in the last 6 months ? No  No concerns of substance abuse are reported.     If \"YES\" -What substance, How much, How often?     If illegal substance: Refer to [facility] (for FRANCA) or [facility].   If Alcohol in excess of 10 drinks per week:  Refer to [facility] (for FRANCA) or [facility]    Legal History-     Is this " "treatment court ordered? No   If \"yes \"send to :  Talk Therapy : Send to Clyde Guevara for final determination   Med Management: Send to Dr. Paris for final determination     Has the Patient been convicted of a felony?  No   If \"Yes\" send to -When, What?  Talk Therapy: Send to Clyde Guevara for final determination   Med Management: Send to Dr. Paris for final determination     ACCEPTED as a patient Yes  If \"Yes\" Appointment Date: 2/27/2025    Referred Elsewhere? No  If “Yes” - (Where? Ex: [facility], [facility], [facility], [facility], etc.)       Name of Insurance Co:[facility]   Insurance ID#[id number]  Insurance Phone #  If ins is primary or secondary?Primary   If patient is a minor, parents information such as Name, D.O.B of guarantor.  "

## 2024-12-30 ENCOUNTER — TELEPHONE (OUTPATIENT)
Dept: PSYCHIATRY | Facility: CLINIC | Age: 19
End: 2024-12-30

## 2024-12-30 NOTE — TELEPHONE ENCOUNTER
One week follow up call for New Patient appointment with Carmina Fofana on 2/27/25  was made on 12/30/24. Writer informed patient of New Patient paperwork needing to be completed 5 days prior to the appointment. Writer confirmed paperwork has been sent via [employer].    Appointment was made on: 12/23/24

## 2025-01-12 PROBLEM — Z11.3 SCREEN FOR STD (SEXUALLY TRANSMITTED DISEASE): Status: RESOLVED | Noted: 2024-12-13 | Resolved: 2025-01-12

## 2025-01-12 PROBLEM — Z01.419 ENCOUNTER FOR GYNECOLOGICAL EXAMINATION WITHOUT ABNORMAL FINDING: Status: RESOLVED | Noted: 2024-12-13 | Resolved: 2025-01-12
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
## 2025-02-27 ENCOUNTER — OFFICE VISIT (OUTPATIENT)
Dept: PSYCHIATRY | Facility: CLINIC | Age: 20
End: 2025-02-27
Payer: COMMERCIAL

## 2025-02-27 DIAGNOSIS — F12.90 MARIJUANA USE: ICD-10-CM

## 2025-02-27 DIAGNOSIS — F41.1 GENERALIZED ANXIETY DISORDER: Primary | ICD-10-CM

## 2025-02-27 PROCEDURE — 90792 PSYCH DIAG EVAL W/MED SRVCS: CPT

## 2025-02-27 RX ORDER — PANTOPRAZOLE SODIUM 40 MG/1
1 TABLET, DELAYED RELEASE ORAL
COMMUNITY
Start: 2025-02-04

## 2025-02-27 RX ORDER — ESCITALOPRAM OXALATE 5 MG/1
5 TABLET ORAL DAILY
Qty: 30 TABLET | Refills: 1 | Status: SHIPPED | OUTPATIENT
Start: 2025-02-27

## 2025-02-27 NOTE — ASSESSMENT & PLAN NOTE
Variable   Start Lexapro 5 mg daily to help with anxiety symptoms   Recommended outpatient therapy, patient is on the wait list     Orders:    escitalopram (LEXAPRO) 5 mg tablet; Take 1 tablet (5 mg total) by mouth daily

## 2025-02-27 NOTE — BH TREATMENT PLAN
TREATMENT PLAN (Medication Management Only)        [facility] - [facility]    Name and Date of Birth:  Carin Fraire 19 y.o. 2005  MRN: [id number]  Date of Treatment Plan: February 27, 2025  Diagnosis/Diagnoses:    1. Generalized anxiety disorder    2. Marijuana use      Strengths/Personal Resources for Self-Care: supportive family, taking medications as prescribed, ability to adapt to life changes.  Area/Areas of need (in own words): anxiety  1. Long Term Goal:   maintain acceptable anxiety level.  Target Date:6 months - 8/27/2025  Person/Persons responsible for completion of goal: Carin  2.  Short Term Objective (s) - How will we reach this goal?:   A.  Provider new recommended medication/dosage changes and/or continue medication(s): continue current medications as prescribed.  B.  Attend medication management appointments regularly..  C.  N/A.  Target Date:6 months - 8/27/2025  Person/Persons Responsible for Completion of Goal: Carin  Progress Towards Goals: starting treatment  Treatment Modality: medication management every 1 months  Review due 180 days from date of this plan: 6 months - 8/27/2025  Expected length of service: ongoing treatment unless revised  My Physician/PA/NP and I have developed this plan together and I agree to work on the goals and objectives. I understand the treatment goals that were developed for my treatment.   Electronic Signatures: on file (unless signed below)    Carmina Fofana PA-C 02/27/25

## 2025-02-27 NOTE — BH CRISIS PLAN
Client Name: Carin Fraire       Client YOB: 2005    [employer] Safety Plan      Creation Date: 2/27/25 Update Date: 2/27/25   Created By: Carmina Fofana PA-C Last Updated By: Carmina Fofana PA-C      Step 1: Warning Signs:   Warning Signs   increased anxiety / anxiety attacks   decreased functioning / not being able to do activities            Step 2: Internal Coping Strategies:   Internal Coping Strategies   try to relax / breathe   try to eat a good meal            Step 3: People and social settings that provide distraction:   Name Contact Information   Sister (Jhoana) number in cell phone   Boyfriend (Jerel) number in cell phone            Step 4: People whom I can ask for help during a crisis:      Name Contact Information    Sister (Jhoana) number in cell phone    Boyfriend (Jerel) number in cell phone      Step 5: Professionals or agencies I can contact during a crisis:      Clinican/Agency Name Phone Emergency Contact    Carmina Fofana PA-C ([facility] [place]) [phone]       Local Emergency Department Emergency Department Phone Emergency Department Address    911          Crisis Phone Numbers:   Suicide Prevention Lifeline: Call or Text  398 Crisis Text Line: Text HOME to 943-359   Please note: Some [facility] do not have a separate number for Child/Adolescent specific crisis. If your county is not listed under Child/Adolescent, please call the adult number for your county      Adult Crisis Numbers: Child/Adolescent Crisis Numbers   [facility]: [phone] [facility]: [phone]   [facility]: [phone] [facility]: [phone]   [facility]: [phone] [place], [place]: [phone]   [facility]: [phone] [place] [facility]: [phone]   [place] [facility]: [phone]   [facility]: [phone]   [facility]: [phone]   [facility]: [phone] (daytime) [phone] (after  hours, weekends, holidays)      Step 6: Making the environment safer (plan for lethal means safety):   Patient did not identify any lethal methods: Yes     Optional: What is most important to me and worth living for?      [employer] Safety Plan. Jo-Ann Mcmahon and Luis Rivas. Used with permission of the authors.

## 2025-03-04 ENCOUNTER — TELEPHONE (OUTPATIENT)
Dept: PSYCHIATRY | Facility: CLINIC | Age: 20
End: 2025-03-04

## 2025-03-04 NOTE — TELEPHONE ENCOUNTER
Left voicemail informing patient and/or parent/guardian of the Psych Encounter form needing to be signed as a requirement from the insurance company for billing purposes. Patient can access form via [employer] and sign electronically.     Please make patient aware this form must be signed for each visit as a requirement to continue future visits with provider.

## 2025-03-11 ENCOUNTER — TELEPHONE (OUTPATIENT)
Dept: PSYCHIATRY | Facility: CLINIC | Age: 20
End: 2025-03-11

## 2025-03-11 NOTE — TELEPHONE ENCOUNTER
Left voicemail informing patient and/or parent/guardian of the Psych Encounter form needing to be signed as a requirement from the insurance company for billing purposes. Patient can access form via [employer] and sign electronically.     Please make patient aware this form must be signed for each visit as a requirement to continue future visits with provider.

## 2025-03-19 ENCOUNTER — TELEPHONE (OUTPATIENT)
Dept: PSYCHIATRY | Facility: CLINIC | Age: 20
End: 2025-03-19
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
## 2025-03-27 ENCOUNTER — TELEPHONE (OUTPATIENT)
Dept: PSYCHIATRY | Facility: CLINIC | Age: 20
End: 2025-03-27

## 2025-03-27 ENCOUNTER — TELEMEDICINE (OUTPATIENT)
Dept: PSYCHIATRY | Facility: CLINIC | Age: 20
End: 2025-03-27
Payer: COMMERCIAL

## 2025-03-27 DIAGNOSIS — F41.1 GENERALIZED ANXIETY DISORDER: Primary | ICD-10-CM

## 2025-03-27 DIAGNOSIS — F43.22 ADJUSTMENT DISORDER WITH ANXIETY: ICD-10-CM

## 2025-03-27 PROCEDURE — 99214 OFFICE O/P EST MOD 30 MIN: CPT

## 2025-03-27 RX ORDER — ESCITALOPRAM OXALATE 5 MG/1
5 TABLET ORAL DAILY
Qty: 30 TABLET | Refills: 1 | Status: SHIPPED | OUTPATIENT
Start: 2025-03-27

## 2025-03-27 NOTE — ASSESSMENT & PLAN NOTE
Variable   Continue Lexapro 5 mg daily to help with anxiety symptoms   Discussed decreasing use of marijuana and reviewed the long-term negative effects on mood   Recommended outpatient therapy, patient is on the wait list

## 2025-03-27 NOTE — ASSESSMENT & PLAN NOTE
Variable   Continue Lexapro 5 mg daily to help with anxiety symptoms   Discussed decreasing use of marijuana and reviewed the long-term negative effects on mood   Recommended outpatient therapy, patient is on the wait list    Orders:    escitalopram (LEXAPRO) 5 mg tablet; Take 1 tablet (5 mg total) by mouth daily
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
## 2025-05-15 ENCOUNTER — OFFICE VISIT (OUTPATIENT)
Dept: PSYCHIATRY | Facility: CLINIC | Age: 20
End: 2025-05-15
Payer: COMMERCIAL

## 2025-05-15 DIAGNOSIS — F41.1 GENERALIZED ANXIETY DISORDER: Primary | ICD-10-CM

## 2025-05-15 PROCEDURE — 99214 OFFICE O/P EST MOD 30 MIN: CPT

## 2025-05-15 RX ORDER — ESCITALOPRAM OXALATE 10 MG/1
10 TABLET ORAL DAILY
Qty: 30 TABLET | Refills: 1 | Status: SHIPPED | OUTPATIENT
Start: 2025-05-15

## 2025-05-15 NOTE — ASSESSMENT & PLAN NOTE
Variable   Increase to Lexapro 10 mg daily to help with anxiety symptoms   Recommended outpatient therapy, patient is on the wait list    Orders:    escitalopram (Lexapro) 10 mg tablet; Take 1 tablet (10 mg total) by mouth daily
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
## 2025-06-27 ENCOUNTER — TELEPHONE (OUTPATIENT)
Age: 20
End: 2025-06-27

## 2025-06-27 ENCOUNTER — TELEMEDICINE (OUTPATIENT)
Dept: PSYCHIATRY | Facility: CLINIC | Age: 20
End: 2025-06-27
Payer: COMMERCIAL

## 2025-06-27 DIAGNOSIS — F41.1 GENERALIZED ANXIETY DISORDER: Primary | ICD-10-CM

## 2025-06-27 DIAGNOSIS — F43.22 ADJUSTMENT DISORDER WITH ANXIETY: ICD-10-CM

## 2025-06-27 PROCEDURE — 99214 OFFICE O/P EST MOD 30 MIN: CPT

## 2025-06-27 RX ORDER — HYDROXYZINE HYDROCHLORIDE 25 MG/1
12.5-25 TABLET, FILM COATED ORAL DAILY PRN
Qty: 30 TABLET | Refills: 1 | Status: SHIPPED | OUTPATIENT
Start: 2025-06-27

## 2025-06-27 RX ORDER — ESCITALOPRAM OXALATE 10 MG/1
10 TABLET ORAL DAILY
Qty: 30 TABLET | Refills: 1 | Status: SHIPPED | OUTPATIENT
Start: 2025-06-27

## 2025-06-27 NOTE — ASSESSMENT & PLAN NOTE
Improving   Start hydroxyzine 12.5-25 mg as needed for anxiety symptoms   Continue Lexapro 10 mg daily to help with anxiety symptoms   Recommended outpatient therapy, patient is on the wait list      Orders:    hydrOXYzine HCL (ATARAX) 25 mg tablet; Take 0.5-1 tablets (12.5-25 mg total) by mouth daily as needed for anxiety    escitalopram (Lexapro) 10 mg tablet; Take 1 tablet (10 mg total) by mouth daily

## 2025-06-27 NOTE — TELEPHONE ENCOUNTER
Pt called to get her appt for 6/27 at 10:30am switched to a virtual visit due to waking up late.  Pt has [employer] and will connect with provider through [employer]. Writer switched appt and checked it in.     Provider has been notified.

## 2025-07-31 ENCOUNTER — OFFICE VISIT (OUTPATIENT)
Dept: PSYCHIATRY | Facility: CLINIC | Age: 20
End: 2025-07-31
Payer: COMMERCIAL

## 2025-07-31 DIAGNOSIS — F41.1 GENERALIZED ANXIETY DISORDER: Primary | ICD-10-CM

## 2025-07-31 PROCEDURE — 99214 OFFICE O/P EST MOD 30 MIN: CPT

## 2025-07-31 RX ORDER — ESCITALOPRAM OXALATE 10 MG/1
10 TABLET ORAL DAILY
Qty: 30 TABLET | Refills: 1 | Status: SHIPPED | OUTPATIENT
Start: 2025-07-31

## 2025-07-31 RX ORDER — CLONAZEPAM 0.5 MG/1
.25-.5 TABLET ORAL DAILY PRN
Qty: 10 TABLET | Refills: 0 | Status: SHIPPED | OUTPATIENT
Start: 2025-07-31